# Patient Record
Sex: MALE | Race: WHITE
[De-identification: names, ages, dates, MRNs, and addresses within clinical notes are randomized per-mention and may not be internally consistent; named-entity substitution may affect disease eponyms.]

---

## 2018-03-27 ENCOUNTER — HOSPITAL ENCOUNTER (OUTPATIENT)
Dept: HOSPITAL 68 - ERH | Age: 78
Setting detail: OBSERVATION
LOS: 2 days | End: 2018-03-29
Attending: INTERNAL MEDICINE | Admitting: INTERNAL MEDICINE
Payer: COMMERCIAL

## 2018-03-27 VITALS — SYSTOLIC BLOOD PRESSURE: 140 MMHG | DIASTOLIC BLOOD PRESSURE: 96 MMHG

## 2018-03-27 VITALS — BODY MASS INDEX: 18.52 KG/M2 | HEIGHT: 69 IN | WEIGHT: 125.06 LBS

## 2018-03-27 DIAGNOSIS — E55.9: ICD-10-CM

## 2018-03-27 DIAGNOSIS — I10: ICD-10-CM

## 2018-03-27 DIAGNOSIS — E04.1: ICD-10-CM

## 2018-03-27 DIAGNOSIS — R00.0: ICD-10-CM

## 2018-03-27 DIAGNOSIS — E03.9: ICD-10-CM

## 2018-03-27 DIAGNOSIS — Z79.82: ICD-10-CM

## 2018-03-27 DIAGNOSIS — R55: Primary | ICD-10-CM

## 2018-03-27 LAB
ABSOLUTE GRANULOCYTE CT: 5.2 /CUMM (ref 1.4–6.5)
APTT BLD: 25 SEC (ref 25–37)
BASOPHILS # BLD: 0.1 /CUMM (ref 0–0.2)
BASOPHILS NFR BLD: 0.9 % (ref 0–2)
EOSINOPHIL # BLD: 0.1 /CUMM (ref 0–0.7)
EOSINOPHIL NFR BLD: 2.1 % (ref 0–5)
ERYTHROCYTE [DISTWIDTH] IN BLOOD BY AUTOMATED COUNT: 13.9 % (ref 11.5–14.5)
GRANULOCYTES NFR BLD: 73.9 % (ref 42.2–75.2)
HCT VFR BLD CALC: 43.1 % (ref 42–52)
LYMPHOCYTES # BLD: 1.2 /CUMM (ref 1.2–3.4)
MCH RBC QN AUTO: 32.1 PG (ref 27–31)
MCHC RBC AUTO-ENTMCNC: 33.2 G/DL (ref 33–37)
MCV RBC AUTO: 96.6 FL (ref 80–94)
MONOCYTES # BLD: 0.4 /CUMM (ref 0.1–0.6)
PLATELET # BLD: 197 /CUMM (ref 130–400)
PMV BLD AUTO: 9.1 FL (ref 7.4–10.4)
PROTHROMBIN TIME: 11.2 SEC (ref 9.4–12.5)
RED BLOOD CELL CT: 4.46 /CUMM (ref 4.7–6.1)
WBC # BLD AUTO: 7 /CUMM (ref 4.8–10.8)

## 2018-03-27 PROCEDURE — G0378 HOSPITAL OBSERVATION PER HR: HCPCS

## 2018-03-27 NOTE — RADIOLOGY REPORT
EXAMINATION:
XR PORTABLE CHEST
 
CLINICAL INFORMATION:
Syncope
 
COMPARISON:
8/22/2013
 
TECHNIQUE:
Portable frontal view of the chest was obtained.
 
FINDINGS:
No significant abnormality is noted involving the heart, lungs, mediastinum,
bony thorax or soft tissues.
 
IMPRESSION:
Unremarkable examination.

## 2018-03-27 NOTE — ED GENERAL ADULT
History of Present Illness
 
General
Chief Complaint: Syncope and Near-Syncope
Stated Complaint: BIBA SYNCOPAL EPISODE, A FIB
Source: patient, family
Exam Limitations: no limitations
 
Vital Signs & Intake/Output
Vital Signs & Intake/Output
 Vital Signs
 
 
Date Time Temp Pulse Resp B/P B/P Pulse O2 O2 Flow FiO2
 
     Mean Ox Delivery Rate 
 
 2307       Room Air  
 
 2246 97.9 63 22 140/96  98   
 
 2126 98.2 65 18 168/80  99 Room Air  
 
 1820 97.7 58 18 153/81  100 Room Air  
 
 1655 98.4 60 18 172/85  97 Room Air  
 
 1506      97 Room Air  
 
 1450 98.0 72 16 115/73  98 Room Air  
 
 
 
Allergies
Coded Allergies:
NO KNOWN ALLERGIES (13)
 
Reconcile Medications
Aspirin (Ecotrin*) 325 MG TABLET.DR   1 TAB PO Q48 HEART HEALTH  (Reported)
Cholecalciferol (Vitamin D3) (Vitamin D3) 400 UNIT TABLET   1 TAB PO DAILY 
VITAMIN SUPPORT  (Reported)
Levothyroxine Sodium (Synthroid) 75 MCG TABLET   1 TAB PO DAILY AC THYROID  (
Reported)
 
Triage Nurses Notes Reviewed? yes
Onset: Abrupt
Duration: day(s): (1)
Timing: remote history
Injury Environment: home
Severity: moderate
No Modifying Factors: none
HPI:
Patient is a 77-year-old male presenting to the emergency department with chief 
complaint of 2 episodes of syncope that happened just prior to arrival.  Patient
reports that he was feeling lightheaded after doing dishes and sat down.  Told 
his wife that he was given passout tried to get up to go to the bathroom and 
then passed out.  The wife lowered him to the ground.  He was out for several 
minutes and then woke up.  She called EMS and told him stamina ground but he had
a go to the bathroom so he crawled to the bathroom.  Patient then moved his 
bowels and then had another syncopal episode after moving his bowels on the 
toilet.  Per the wife he was out for several minutes, she thinks 10 minutes.  
And then EMS arrived.  When EMS arrived he was alert and oriented.  Patient does
report that the for onset of the first episode of syncope he had an episode of 
chest pressure that had resolved by the time he woke up.  Unsure of head strike.
 Denies any visual changes.  No current chest pain palpitations or shortness of 
breath.  History of similar symptoms about 5 years ago and had a complete workup
which was negative.  Does have extensive history of thyroid lesion, currently 
being monitored by his endocrinologist.  Denies any recent changes in 
medications.  No recent travel.  Denies recent illness.
(Maddie Shabazz)
 
Past History
 
Travel History
Traveled to [place] past 21 day No
 
Medical History
Any Pertinent Medical History? see below for history
 
Surgical History
Surgical History: non-contributory
 
Psychosocial History
Who do you live with Family
Services at Home None
What is your primary language English
 
Family History
Hx Contributory? No
(Maddie Shabazz)
 
Review of Systems
 
Review of Systems
Constitutional:
Reports: no symptoms. 
Comments
Review of systems: See HPI, All other systems negative.
Constitutional, no chills fever or weight loss
HEENT: No visual changes no sore throat no congestion
Cardiovascular: No palpitation , orthopnea or ankle swelling
Skin, no jaundice no rashes
Respiratory: No dyspnea cough sputum or hemoptysis
GI: No nausea no vomiting
: No dysuria No hematuria
Muscle skeletal: no back pain, no neck pain,
Neurologic: No numbness 
Psych: No stress anxiety or depression,.
Heme/endocrine: No bruising no bleeding no polyuria or polydipsia
Immunology: No splenectomy or history of AIDS
(Maddie Shabazz)
 
Physical Exam
 
Physical Exam
General Appearance: well developed/nourished, no apparent distress, alert, awake
, comfortable
Comments:
Well-developed well-nourished person in no acute distress
HEENT:  extraocular motion intact, no nystagmus. Pupils equally round and 
reactive to light and accommodation. Nose is atraumatic. External auditory canal
and Tympanic membranes clear. Pharynx normal. No swelling or edema. 
Neck: Supple, no lymphadenopathy, normal range of motion without pain or 
tenderness, no C-spine tenderness.
Back: Nontender
Cardiovascular: Regular rate and rhythms, unable to appreciate any murmurs rubs 
or gallops.
Respiratory: Chest nontender. No respiratory distress.breath sounds clear to 
auscultation bilaterally
Abdomen: Soft, nontender nondistended, no appreciable organomegaly. Normal bowel
sounds. No ascites, no rebound or guarding.  No palpable masses.
Extremity: No edema, no calf tenderness to palpation, normal and equal pulses.  
Full range of motion of upper and lower extremities without difficulty or pain. 
Muscular strength is 5 out of 5 in upper and lower extremities bilaterally.  
 strength is equal and symmetric bilaterally.
Neuro: Alert oriented x3, motor sensory normal, cranial nerves II through XII 
grossly intact.  Cerebellar testing is unremarkable.  Walks with steady gait.
Skin: No appreciable rash on exposed skin, skin is warm and dry.
Psych: Mood and affect is normal, memory and judgment is normal.
 
Core Measures
ACS in differential dx? Yes
CVA/TIA Diagnosis: No
Sepsis Present: No
Sepsis Focused Exam Completed? No
(Angie HASSAN,Maddie)
 
Progress
Differential Diagnoses
I considered the following diagnoses in my evaluation of the patient:  Pulmonary
embolus, intracranial hemorrhage, dissection, ACS, dehydration, orthostatic 
hypotension, seizure, cardiac arrhythmia
 
Plan of Care:
 Orders
 
 
Procedure Date/time Status
 
Regular Diet  B Active
 
CBC WITHOUT DIFFERENTIAL  0600 Active
 
BASIC ELECTROLYTES PLUS BUN&CR  0600 Active
 
TROPONIN LEVEL  0300 Active
 
EKG  0300 Active
 
Teach/Educate  230 Active
 
Pain Treatment and Response  230 Active
 
Nutritional Intake, Monitor  230 Active
 
Isolation  230 Active
 
Patient Care Conference  2304 Active
 
Activity/Ambulation  2304 Active
 
[id number] DOPPLER 2213 Active
 
ECHOCARDIOGRAM  2213 Active
 
Code Status  212 Active
 
TROPONIN LEVEL  2113 Complete
 
EKG 2113 Active
 
Pathway - chart  211 Active
 
House Staff  211 Active
 
Patient Data 2111 Active
 
Code Status  211 Complete
 
Patient Data 2012 Active
 
Place in observation  1943 Active
 
ED Holding Orders  1943 Active
 
Vital Signs  1943 Active
 
Code Status  1943 Complete
 
Add-on Test (ER Only)  1717 Active
 
MISTAKE  1524 Active
 
Intake & Output  1454 Active
 
D-DIMER  1449 Complete
 
Telemetry/Cardiac Monitor  1439 Active
 
URINALYSIS  1439 Complete
 
TSH REFLEX  1439 Complete
 
TROPONIN LEVEL  1439 Complete
 
PARTIAL THROMBOPLASTIN TIME  1439 Complete
 
PROTHROMBIN TIME  1439 Complete
 
COMPREHENSIVE METABOLIC PANEL  1439 Complete
 
CBC WITHOUT DIFFERENTIAL  1439 Complete
 
EKG  1439 Active
 
VTE Mechanical Prophylaxis   UNK Active
 
Vital Signs   UNK Active
 
MISTAKE   UNK Active
 
Telemetry/Cardiac Monitor   UNK Active
 
 
 Current Medications
 
 
  Sig/Pranav Start time  Last
 
Medication Dose  Stop Time Status Admin
 
Aspirin Buffered 325 MG Q48  1000 AC 
 
(Ecotrin)     
 
Cholecalciferol 400 IU DAILY  1000 AC 
 
(Vitamin D)     
 
Levothyroxine Sodium 0.075 MG DAILY AC  0700 AC 
 
(Synthroid)     
 
Heparin Sodium  5,000 UNIT Q8  2200 AC 
 
(Porcine)     
 
Acetaminophen 650 MG Q6P PRN  2115 AC 
 
(Tylenol)     
 
 
 Laboratory Tests
 
 
 
18 2135:
Troponin I 0.02
 
18 1533:
Urine Color YEL, Urine Clarity CLEAR, Urine pH 6.0, Ur Specific Gravity 1.020, 
Urine Protein TRACE  H, Urine Ketones NEG, Urine Nitrite NEG, Urine Bilirubin 
NEG, Urine Urobilinogen 0.2, Ur Leukocyte Esterase NEG, Ur Microscopic SEDIMENT 
EXAMINED, Urine RBC FEW  H, Urine WBC 1-3  H, Ur Epithelial Cells RARE, Urine 
Bacteria FEW  H, Hyaline Casts RARE  H, Urine Mucus RARE, Urine Hemoglobin NEG, 
Urine Glucose NEG
 
18 1449:
Anion Gap 12, Estimated GFR 53  L, BUN/Creatinine Ratio 19.2, Glucose 176  H, 
Calcium 9.3, Total Bilirubin 0.6, AST 39, ALT 37, Alkaline Phosphatase 50, 
Troponin I < 0.01, Total Protein 6.9, Albumin 3.9, Globulin 3.0, Albumin/
Globulin Ratio 1.3, TSH &T3 &Free T4 Intrp 1.530, PT 11.2, INR 1.03, APTT 25, D-
Dimer High Sensitivty 239, CBC w Diff NO MAN DIFF REQ, RBC 4.46  L, MCV 96.6  H,
MCH 32.1  H, MCHC 33.2, RDW 13.9, MPV 9.1, Gran % 73.9, Lymphocytes % 17.5  L, 
Monocytes % 5.6, Eosinophils % 2.1, Basophils % 0.9, Absolute Granulocytes 5.2, 
Absolute Lymphocytes 1.2, Absolute Monocytes 0.4, Absolute Eosinophils 0.1, 
Absolute Basophils 0.1
 
Diagnostic Imaging:
Viewed by Me: CT Scan.  Discussed w/RAD: CT Scan. 
Radiology Impression: RESENT AGE: 77  PATIENT ACCOUNT NO: [id number] : 39 
LOCATION: [facility] ORDERING PHYSICIAN: Maddie HASSAN     SERVICE DATE: 18 EXAM TYPE: CAT - CT CERV SPINE WO IV CONTRAST; CT HEAD WO IV CONTRAST 
EXAMINATION: CT HEAD WITHOUT CONTRAST CT CERVICAL SPINE WITHOUT CONTRAST 
CLINICAL INFORMATION: Question head strike. Rule out fracture. COMPARISON: None 
TECHNIQUE: CT of the head and cervical spine were performed without intravenous 
contrast. Multiplanar reformats were rendered and reviewed. DLP: 974 mGy-cm. 
FINDINGS: CT head: There is no intracranial hemorrhage, extra-axial collection, 
or calvarial fracture. There is no mass, mass effect, or CT evidence of large 
territory infarction. The ventricles are normal in size and configuration 
without evidence of hydrocephalus. The paranasal sinuses are clear. The mastoids
and middle ear cavities are clear. CT cervical spine: There is grade 1 
retrolisthesis of C3 on C4 and grade 1 anterolisthesis of C4 on C5. The 
alignment is otherwise maintained. No cervical spine fracture is seen. The 
craniovertebral junction is intact. There are multilevel degenerative changes 
with severe disc height loss at C3-C4, C5-C6, and C6-C7. There is multilevel 
uncovertebral and facet arthropathy. There is prominent disc osteophyte complex 
at C5-C6 which results in mild osseous encroachment on the spinal canal. There 
is severe neural foraminal stenosis bilaterally at C5-C6 and at C6-C7. There are
atheromatous calcifications at the bilateral carotid bifurcations. There is a 
mass within the left lobe of the thyroid gland which extends substernally into 
the superior mediastinum with full extent not imaged. This lesion measures on 
the order of 3.3 cm and demonstrates heterogeneous attenuation including areas 
of curvilinear and dystrophic calcifications. There is minimal mass effect on 
the trachea but no narrowing of its lumen. The lung apices are clear. IMPRESSION
: CT head: No acute intracranial abnormality. CT cervical spine: No cervical 
spine fracture or traumatic malalignment. Multilevel degenerative changes are 
noted with severe neural foraminal stenosis bilaterally at C5-C6 and C6-C7. 
Heterogeneous lesion in the left lobe of the thyroid extending substernally into
the superior mediastinum measuring on the order of 3.3 cm. According to the most
recent guidelines, thyroid ultrasound is warranted if this has not already been 
performed. DICTATED BY: Juan Og MD  DATE/TIME DICTATED:18 
:RAD.ZAMAN  DATE/TIME TRANSCRIBED:18 CONFIDENTIAL, 
DO NOT COPY WITHOUT APPROPRIATE AUTHORIZATION.  <Electronically signed in Other 
Vendor System>                                                                  
                     SIGNED BY: Juan Og MD 18
Initial ED EKG: NSR
Prior EKG: unchanged
Rhythm Strip: cardiac arrhythmia, read as atrial fibrillation by EMS
Comments:
Patient asymptomatic in the emergency department.  CT head, neck, blood work 
within normal range.  Patient has been in sinus rhythm.  Spoke with cardiology. 
They will consult in the morning.  Patient will be admitted for observation for 
syncope.  Patient may need cardiology and neurology consultations.  Patient made
halter monitor.
(Maddie Shabazz)
 
Departure
 
Departure
Time of Disposition: 
Disposition: STILL A PATIENT
Condition: Stable
Clinical Impression
Primary Impression: Syncope
Qualifiers:  Syncope type: unspecified Qualified Code: R55 - Syncope and 
collapse
Referrals:
Jose JON,Daniel KRISHNA (PCP/Family)
 
Departure Forms:
Customer Survey
General Discharge Information
 
Observation Note
Spoke With:
Bob Montague MD
Physician Advisor Notified: RANDALL FUENTES DO
Place Patient In: Non-ED OBS Care Area
Rationale for Observation:
My rational for observation is as follows .  Patient requiring echocardiogram, 
telemetry monitoring, serial EKGs and troponins, may require carotid Dopplers, 
neuro consultation.  Discharge at this time is medically harmful.
 
(Maddie Shabazz)
 
Observation Note
Rationale for Observation:
My rational for observation is as follows .
 
 
PA/NP Co-Sign Statement
Statement:
ED Attending supervision documentation-
 
[X] I saw and evaluated the patient. I have also reviewed all the pertinent lab 
results and diagnostic results. I agree with the findings and the plan of care 
as documented in the PA's/NP's documentation. 
 
[] I have reviewed the ED Record and agree with the PA's/NP's documentation.
 
[] Additions or exceptions (if any) to the PAs/NP's note and plan are 
summarized below:
[]
 
 
77-year-old man with episode of syncope.  EKG shows frequent PVCs.  Irregular 
bradycardic rhythm on rhythm strip with PJCs.  He is being assigned telemetry 
for dysrhythmia and syncope.
(Randall Fuentes DO)
 
Critical Care Note
 
Critical Care Note
Critical Care Time: 30-74 min
(Angie HASSAN,Maddie)

## 2018-03-27 NOTE — HISTORY & PHYSICAL
Nguyễn Riggs MD 03/27/18 3824:
General Information and HPI
MD Statement:
I have seen and personally examined LEE SIERRA and documented this H&P.
 
The patient is a 77 year old M who presented with a patient stated chief 
complaint of syncope.
 
Source of Information: patient, old records
Exam Limitations: no limitations
History of Present Illness:
77 year old male with past medical history significant for HTN (untreated), 
hypothyroidism, vitamin d deficiency, and thyroid nodule followed by Dr. Garcia 
presents for evaluation of syncopal episode.  Patient states he had the exact 
same presentation five year prior and had a negative work up at this time.  His 
symptoms started earlier today when he felt lightheaded as he was doing dishes. 
He decided he should sit down and was alarmed enough to tell his wife not to 
leave the house.  He got up from a seated position and fell on his hands and 
knees on ambulating to the bathroom.  He denied any loss of consciousness at 
that time although he states his wife thought he lost consciousness for a second
or two.  She called for an ambulance and told him not to get up.  He still had 
to use the bathroom so crawled to the toilet.  He then syncopized after having a
BM.  He lost consciousness and the next thing he remembers is waking up with 
EMTs around him.  He denies any chest pain, diaphoresis, palpitations, or nausea
prior to the syncopal episode.  His initial EKG showed bigeminy and on arrival 
in the ED showed PVCs.  The patient was asymptomatic at the time of evaluation. 
His review of systems is only positive for chronic loose BMs but otherwise no 
complaints.  He ambulates several miles a day without symptoms.  He reports no 
presyncopal episodes or orthostatic symptoms at any time between today and his 
episode five years prior.  He had imaging of the head, neck and chest x-ray 
performed and is admitted to telemetry for further evaluation.
 
Allergies/Medications
Allergies:
Coded Allergies:
NO KNOWN ALLERGIES (08/12/13)
 
Home Med list
Aspirin (Ecotrin*) 325 MG TABLET.   1 TAB PO Q48 HEART HEALTH  (Reported)
Cholecalciferol (Vitamin D3) (Vitamin D3) 400 UNIT TABLET   1 TAB PO DAILY 
VITAMIN SUPPORT  (Reported)
Levothyroxine Sodium (Synthroid) 75 MCG TABLET   1 TAB PO DAILY AC THYROID  (
Reported)
 
Compliance With Home Meds: GOOD
 
Past History
 
Travel History
Traveled to [place] past 21 day No
 
Medical History
Neurological: NONE
EENT: NONE
Cardiovascular: NONE
Respiratory: NONE
Gastrointestinal: NONE
Hepatic: NONE
Renal: NONE
Musculoskeletal: NONE
Psychiatric: NONE
Endocrine: hypothyroidism
Isolation History: Standard
 
Surgical History
Surgical History: non-contributory
 
Past Family/Social History
 
Psychosocial History
Services at Home: None
 
Functional Ability
ADLs
Independent: dressing, eating, toileting, bathing. 
Ambulation: independent
 
Review of Systems
 
Review of Systems
Constitutional:
Reports: no symptoms. 
EENTM:
Reports: no symptoms. 
Cardiovascular:
Reports: syncope. 
Respiratory:
Reports: no symptoms. 
GI:
Reports: no symptoms. 
Genitourinary:
Reports: no symptoms. 
Musculoskeletal:
Reports: no symptoms. 
Skin:
Reports: no symptoms. 
Neurological/Psychological:
Reports: no symptoms. 
Hematologic/Endocrine:
Reports: no symptoms. 
Immunologic/Allergic:
Reports: no symptoms. 
All Other Systems: Reviewed and Negative
 
Exam & Diagnostic Data
Last 24 Hrs of Vital Signs/I&O
 Vital Signs
 
 
Date Time Temp Pulse Resp B/P B/P Pulse O2 O2 Flow FiO2
 
     Mean Ox Delivery Rate 
 
03/27 2307       Room Air  
 
03/27 2246 97.9 63 22 140/96  98   
 
03/27 2126 98.2 65 18 168/80  99 Room Air  
 
03/27 1820 97.7 58 18 153/81  100 Room Air  
 
03/27 1655 98.4 60 18 172/85  97 Room Air  
 
03/27 1506      97 Room Air  
 
03/27 1450 98.0 72 16 115/73  98 Room Air  
 
 
 Intake & Output
 
 
 03/28 0800 03/28 0000 03/27 1600
 
Intake Total   0
 
Output Total  200 
 
Balance  -200 0
 
    
 
Intake, Oral   0
 
Output, Urine  200 
 
Patient  56.727 kg 
 
Weight   
 
 
 
 
Physical Exam
General Appearance Alert, Oriented X3, Cooperative, No Acute Distress
Cardiovascular Regular Rate, Normal S1, Normal S2, No Murmurs
Lungs Clear to Auscultation, Normal Air Movement
Abdomen Normal Bowel Sounds, Soft, No Tenderness, No Masses
Extremities No Clubbing, No Cyanosis, No Edema, Normal Pulses
Last 24 Hrs of Labs/Phil:
 Laboratory Tests
 
03/27/18 2135:
Troponin I 0.02
 
03/27/18 1533:
Urine Color YEL, Urine Clarity CLEAR, Urine pH 6.0, Ur Specific Gravity 1.020, 
Urine Protein TRACE  H, Urine Ketones NEG, Urine Nitrite NEG, Urine Bilirubin 
NEG, Urine Urobilinogen 0.2, Ur Leukocyte Esterase NEG, Ur Microscopic SEDIMENT 
EXAMINED, Urine RBC FEW  H, Urine WBC 1-3  H, Ur Epithelial Cells RARE, Urine 
Bacteria FEW  H, Hyaline Casts RARE  H, Urine Mucus RARE, Urine Hemoglobin NEG, 
Urine Glucose NEG
 
03/27/18 1449:
Anion Gap 12, Estimated GFR 53  L, BUN/Creatinine Ratio 19.2, Glucose 176  H, 
Calcium 9.3, Magnesium Pending, Total Bilirubin 0.6, AST 39, ALT 37, Alkaline 
Phosphatase 50, Troponin I < 0.01, Total Protein 6.9, Albumin 3.9, Globulin 3.0,
Albumin/Globulin Ratio 1.3, TSH &T3 &Free T4 Intrp 1.530, PT 11.2, INR 1.03, 
APTT 25, D-Dimer High Sensitivty 239, CBC w Diff NO MAN DIFF REQ, RBC 4.46  L, 
MCV 96.6  H, MCH 32.1  H, MCHC 33.2, RDW 13.9, MPV 9.1, Gran % 73.9, Lymphocytes
% 17.5  L, Monocytes % 5.6, Eosinophils % 2.1, Basophils % 0.9, Absolute 
Granulocytes 5.2, Absolute Lymphocytes 1.2, Absolute Monocytes 0.4, Absolute 
Eosinophils 0.1, Absolute Basophils 0.1
 
 
Diagnostic Data
CXR Results
No significant abnormality is noted involving the heart, lungs, mediastinum,
bony thorax or soft tissues.
Other Results
CT head:
There is no intracranial hemorrhage, extra-axial collection, or calvarial
fracture. There is no mass, mass effect, or CT evidence of large territory
infarction. The ventricles are normal in size and configuration without
evidence of hydrocephalus.
 
The paranasal sinuses are clear. The mastoids and middle ear cavities are
clear.
 
CT cervical spine:
There is grade 1 retrolisthesis of C3 on C4 and grade 1 anterolisthesis of C4
on C5. The alignment is otherwise maintained. No cervical spine fracture is
seen. The craniovertebral junction is intact. There are multilevel
degenerative changes with severe disc height loss at C3-C4, C5-C6, and C6-C7.
There is multilevel uncovertebral and facet arthropathy. There is prominent
disc osteophyte complex at C5-C6 which results in mild osseous encroachment
on the spinal canal. There is severe neural foraminal stenosis bilaterally at
C5-C6 and at C6-C7.
 
There are atheromatous calcifications at the bilateral carotid bifurcations.
There is a mass within the left lobe of the thyroid gland which extends
substernally into the superior mediastinum with full extent not imaged. This
lesion measures on the order of 3.3 cm and demonstrates heterogeneous
attenuation including areas of curvilinear and dystrophic calcifications.
There is minimal mass effect on the trachea but no narrowing of its lumen.
The lung apices are clear.
 
Assessment/Plan
Assessment:
77 year old male with past medical history significant for HTN (untreated), 
hypothyroidism, vitamin d deficiency, and thyroid nodule followed by Dr. Garcia 
presents for evaluation of syncopal episode.
 
Syncope:
 Monitor on telemetry
 Check orthostatic vital signs
 Check serial troponins and EKGs
 Check echocardiogram
 Cardiology consultation
 Check carotid ultrasound given atheromatous calcifications on CT
 CT head negative
 Continue aspirin
 
Hypothyroidism:
 TSH wnl
 Continue synthroid 75mcg
 
Hypertension:
 Patient was reluctant to start antihypertensive as an outpatient
 SBP 140s-170s
 Continue to monitor, discuss medical mgmt with patient
 
Hyperglycemia:
 Check hemoglobin a1c
 
Elevated creatinine:
 NS x 1L
 Repeat BEP in AM
 
Heart healthy diet
DVT ppx-heparin 5000unit subcutaneous q8h
DNR/DNI
 
As Ranked By This Provider
Problem List:
 1. Syncope
   Qualifiers
 Syncope type: unspecified Qualified Code: R55 - Syncope and collapse
 
 2. Hypothyroidism
 
 
Core Measures/Misc (9/17)
 
Acute Coronary Syndrome
ACS Diagnosis: No
 
Congestive Heart Failure
Congestive Heart Failure Diagnosis No
 
Cerebrovascular Accident
CVA/TIA Diagnosis: No
 
VTE (View Protocol)
VTE Risk Factors Age>40
No Mechanical VTE Prophylaxis d/t N/A MechProphylax Ordered
No VTE Pharm Prophylaxis d/t NA PharmProphylax ordered
 
Sepsis (View protocol)
Sepsis Present: No
 
 
Bob Montague 03/28/18 0351:
Attending MD Review Statement
 
Attending Statement
Attending MD Statement: examined this patient, discuss w/resident/PA/NP, agreed 
w/resident/PA/NP, reviewed EMR data (avail), reviewed images, amended to note
Attending Assessment/Plan:
 
CC: Syncope
PMH: Thyroid nodule, hypothyroidism, white coat hypertension
 
Patient was brought in ER through EMS after a syncopal episode at home. This 
morning patient was feeling lightheaded. Then he sat down on a recliner later he
had feeling of bowel movement so he was getting up to go to bathroom when he 
almost passed out, wife supported him and that time, he fell down and remembers 
crawling to the bathroom. At that time wife called EMS. While in bathroom 
patient actually passed out and remembers waking up when EMT was there. He did 
not have a diarrhea episode it was a regular bowel movement according to him. He
did not notice any chest pain, shortness of breath, diaphoresis, chest tightness
, nausea, vomiting before the episode. Wife did not notice any seizure-like 
activity. Patient had similar episode in 2013 when he was suggested to follow-up
outpatient which he could not. No obvious reason was found at that time, 
probably fluctuation and the blood pressure that was notified to him. He was 
suggested to wear stockings. Currently complete ROS unremarkable.
 
Vitals: Afebrile, pulse 60, RR 16, blood pressure 115/73 on arrival, saturating 
well on room air.
On exam: A O 3, cooperative, no acute distress, neck supple, JVD normal, no 
lymphadenopathy, mucosa moist, no focal neurological deficit, no dependent edema
, no obvious skin rashes or inflammation CVS: S1-S2, RRR. RS: Clear to 
auscultate bilaterally. Abdomen: Soft, NT, ND, bowel sounds present.
 
CXR: Unremarkable examination.
CT head and cervical spine without IV contrast:
No acute intracranial abnormality.
No cervical spine fracture or traumatic malalignment. Multilevel degenerative 
changes are noted with severe neural foraminal stenosis bilaterally at C5-C6 and
C6-C7. 
Heterogeneous lesion in the left lobe of the thyroid extending substernally into
the superior mediastinum measuring on the order of 3.3 cm. 
There are atheromatous calcifications at the bilateral carotid bifurcations.
 
Assessment and plan 
 
77-year-old male with history of hypothyroidism and thyroid nodule and probably 
whitecoat hypertension presented in ER after an episode of syncope. Patient felt
lightheaded followed by one episode of presyncope and then actual syncope event 
he lost consciousness. He denies any chest pain, palpitations, shortness of 
breath or diaphoresis before the syncopal episode. Complete examination normal. 
ECG done by EMT shows ventricular bigeminy, repeat ECG was normal except PVCs. 
Patient needs further evaluation for his syncopal episode, appears to be 
secondary to arrhythmia. He had similar episode in 2013, exact cause of syncope 
at that time was not clear, he was notified that it could be probably secondary 
to fluctuation in his blood pressure, suggested to wear stockings. 
 
+ Syncope and collapse
+ History of hypothyroidism and thyroid nodule
 
- Place in observation on telemetry
- Continuous telemetry monitoring
- Serial troponin and EKG
- Orthostatic vitals
- 2-D echocardiogram in a.m.
- Carotid Doppler given the atheromatous plaque in bilateral carotid 
bifurcation.
- Cardiology consult
- DVT prophylaxis
- Adequate pain control
- Check magnesium, replete if low
- DVT prophylaxis
 
 
 
 
Winston Singh 03/28/18 0358:
Resident Review Statement
Resident Statement: examined this patient, discussed with intern, agreed with 
intern, discussed with family, reviewed EMR data (avail), discussed with nursing
, discussed with case mgmt, reviewed images, amended to note
Other Findings:
This is a 77-year-old male with past medical history significant for 
hypothyroidism, white coat hypertension, stage I diastolic dysfunction, history 
of syncope in the past presented to the hospital for evaluation of an episode of
syncope.
 
Patient reports that he has had episode of witnessed syncope at home around this
morning.  Patient felt dizzy and lightheaded after his breakfast, fell on the 
floor with out LOC, remembers crawling to the bathroom for bowel movement.  He 
remembers that EMS was called after that as he passed out during bowel movement.
 Denies any bladder or bowel incontinence, seizures, postictal confusion.  
Denies any chest pain, short of breath, diaphoresis.
 
And reports that he had similar episode of syncope in 2013, he was admitted to 
[facility], echocardiogram was done in 2013 which showed stage I diastolic
dysfunction.  No obvious reason was found at that time he was suggested to wear 
stockings.  Were followed up with any cardiologist.
 
Review of systems was completely negative except for syncopal episode.  Denied 
smoking, alcohol abuse, illicit drug abuse.  He follows up with his primary care
physician Dr. Garcia twice in a year.
 
--------------------------------------------------------------------------------
----------
 
Vitals: 
Afebrile, pulse 60, RR 16, blood pressure 115/73 on arrival, saturating well on 
room air.
 
 
On exam: A O 3, cooperative, no acute distress, neck supple, JVD normal, no 
lymphadenopathy, mucosa moist, no focal neurological deficit, no dependent edema
, no obvious skin rashes or inflammation CVS: S1-S2, RRR. RS: Clear to 
auscultate bilaterally. Abdomen: Soft, NT, ND, bowel sounds present.
 
 
Labs
CBC and CMP within normal limits except for creatinine 1.3, baseline was 1.
LFT normal
TSH normal
 
EKG- EMT EKG showed ventricular bigeminy however repeat EKG was normal, sinus 
rhythm, rate 54, PVC.  No ST-T wave changes
CXR: Unremarkable examination.
CT head and cervical spine without IV contrast:
No acute intracranial abnormality.
No cervical spine fracture or traumatic malalignment. Multilevel degenerative 
changes are noted with severe neural foraminal stenosis bilaterally at C5-C6 and
C6-C7. 
Heterogeneous lesion in the left lobe of the thyroid extending substernally into
the superior mediastinum measuring on the order of 3.3 cm. 
There are atheromatous calcifications at the bilateral carotid bifurcations.
 
--------------------------------------------------------------------------------
------------
 
Syncope and collapse
77-year-old male with past medical history of hypothyroidism, syncope presented 
with an episode of witnessed syncope.  He felt lightheaded followed by an 
episode of presyncope, actual syncope during bowel movement.  No chest pain 
palpitations, short of breath or diaphoresis associated with the syncopal 
episode.
 
* Syncope possibly from vasovagal versus arrhythmias given his ventricular 
bigeminy, PVCs.
* Place in observation in the telemetry floor
* Continuous telemetry monitoring
* Serial troponins were negative
* Initial EKG by EMS showed ventricular bigeminy
* Repeat EKG sinus rhythm, rate 54, PVC, no acute ST-T wave changes
* Serial troponin and EKG
* Cardiology consult in a.m.
* 2-D echo in the a.m.
* orthostatic vitals
* vitals every shift
 
 
 
carotid artery calcifications
Cervical spine CT showed  atheromatous calcifications at the bilateral carotid 
bifurcations.
Will get a carotid artery ultrasound
 
 
 
 
Thyroid gland nodule
There is a mass within the left lobe of the thyroid gland which extends
substernally into the superior mediastinum with full extent not imaged. This
lesion measures on the order of 3.3 cm and demonstrates heterogeneous
attenuation including areas of curvilinear and dystrophic calcifications.
There is minimal mass effect on the trachea but no narrowing of its lumen.
* he needs outpatient follow-up for further workup
 
 
 
 
Hypothyroidism-continue Synthyroid 75 g daily
Continue vitamin D supplement
Continue aspirin 325 every 48
 
 
 
Patient is DNR/DNI
DVT prophylaxis subcutaneous heparin
Regular diet
Pain pathway altered
Replete electrolytes

## 2018-03-27 NOTE — CT SCAN REPORT
EXAMINATION:
CT HEAD WITHOUT CONTRAST
CT CERVICAL SPINE WITHOUT CONTRAST
 
CLINICAL INFORMATION:
Question head strike. Rule out fracture.
 
COMPARISON:
None
 
TECHNIQUE:
CT of the head and cervical spine were performed without intravenous
contrast. Multiplanar reformats were rendered and reviewed.
 
DLP:
974 mGy-cm.
 
FINDINGS:
CT head:
There is no intracranial hemorrhage, extra-axial collection, or calvarial
fracture. There is no mass, mass effect, or CT evidence of large territory
infarction. The ventricles are normal in size and configuration without
evidence of hydrocephalus.
 
The paranasal sinuses are clear. The mastoids and middle ear cavities are
clear.
 
CT cervical spine:
There is grade 1 retrolisthesis of C3 on C4 and grade 1 anterolisthesis of C4
on C5. The alignment is otherwise maintained. No cervical spine fracture is
seen. The craniovertebral junction is intact. There are multilevel
degenerative changes with severe disc height loss at C3-C4, C5-C6, and C6-C7.
There is multilevel uncovertebral and facet arthropathy. There is prominent
disc osteophyte complex at C5-C6 which results in mild osseous encroachment
on the spinal canal. There is severe neural foraminal stenosis bilaterally at
C5-C6 and at C6-C7.
 
There are atheromatous calcifications at the bilateral carotid bifurcations.
There is a mass within the left lobe of the thyroid gland which extends
substernally into the superior mediastinum with full extent not imaged. This
lesion measures on the order of 3.3 cm and demonstrates heterogeneous
attenuation including areas of curvilinear and dystrophic calcifications.
There is minimal mass effect on the trachea but no narrowing of its lumen.
The lung apices are clear.
 
IMPRESSION:
CT head:
No acute intracranial abnormality.
 
CT cervical spine:
No cervical spine fracture or traumatic malalignment. Multilevel degenerative
changes are noted with severe neural foraminal stenosis bilaterally at C5-C6
and C6-C7.
 
Heterogeneous lesion in the left lobe of the thyroid extending substernally
into the superior mediastinum measuring on the order of 3.3 cm. According to
the most recent guidelines, thyroid ultrasound is warranted if this has not
already been performed.

## 2018-03-28 VITALS — SYSTOLIC BLOOD PRESSURE: 138 MMHG | DIASTOLIC BLOOD PRESSURE: 74 MMHG

## 2018-03-28 VITALS — SYSTOLIC BLOOD PRESSURE: 144 MMHG | DIASTOLIC BLOOD PRESSURE: 86 MMHG

## 2018-03-28 VITALS — DIASTOLIC BLOOD PRESSURE: 72 MMHG | SYSTOLIC BLOOD PRESSURE: 170 MMHG

## 2018-03-28 LAB
ABSOLUTE GRANULOCYTE CT: 5.5 /CUMM (ref 1.4–6.5)
BASOPHILS # BLD: 0 /CUMM (ref 0–0.2)
BASOPHILS NFR BLD: 0.3 % (ref 0–2)
EOSINOPHIL # BLD: 0.1 /CUMM (ref 0–0.7)
EOSINOPHIL NFR BLD: 1.3 % (ref 0–5)
ERYTHROCYTE [DISTWIDTH] IN BLOOD BY AUTOMATED COUNT: 13.6 % (ref 11.5–14.5)
GRANULOCYTES NFR BLD: 78.3 % (ref 42.2–75.2)
HCT VFR BLD CALC: 43.5 % (ref 42–52)
LYMPHOCYTES # BLD: 1 /CUMM (ref 1.2–3.4)
MCH RBC QN AUTO: 31.6 PG (ref 27–31)
MCHC RBC AUTO-ENTMCNC: 32.7 G/DL (ref 33–37)
MCV RBC AUTO: 96.5 FL (ref 80–94)
MONOCYTES # BLD: 0.4 /CUMM (ref 0.1–0.6)
PLATELET # BLD: (no result) /CUMM (ref 130–400)
PMV BLD AUTO: 9.4 FL (ref 7.4–10.4)
RED BLOOD CELL CT: 4.51 /CUMM (ref 4.7–6.1)
WBC # BLD AUTO: 7 /CUMM (ref 4.8–10.8)

## 2018-03-28 NOTE — ECHOCARDIOGRAM REPORT
LEE SIERRA 
 
 Age:    77     :    1940      Gender:     M 
 
 MRN:    [id number] 
 
 Exam Date:     2018  
                09:15 
 
 Exam Location: 1  
 [place] 
 
 Ht (in):     68      Wt (lb):      125     BSA:    1.64 
 
 BP:          140     /     96 
 
 Ordering Physician:        Khanh Singh MD 
 
 Referring Physician:       Khanh Singh MD 
 
 Technologist:              Dom Curiel [facility] 
 
 Room Number:               189-2 
 
 Indications:       Arrhythmia 
 
 Rhythm:                 Sinus 
 
 Technical Quality:      Good 
 
 FINDINGS 
 
 Left Ventricle 
 Normal global left ventricular size, wall thickness, systolic  
 function with no obvious regional wall motion abnormalities. Left  
 ventricular ejection fraction is estimated at    >65  %. Normal left  
 ventricular diastolic filling pattern for age. 
 
 Right Ventricle 
 The right ventricle is normal in size and function. 
 
 Right Atrium 
 The right atrium is normal in size. 
 
 Left Atrium 
 The left atrium is normal in size.  The interatrial septum is  
 intact. 
 
 Mitral Valve 
 Mild thickening/calcification of the mitral valve leaflets. Trace  
 mitral regurgitation. 
 
 Aortic Valve 
 Focal thickening of the aortic valve cusps. No aortic stenosis.  
 Trace to mild aortic regurgitation. 
 
 Tricuspid Valve 
 The tricuspid valve is normal in structure and function.  There is   
 trace tricuspid regurgitation.  Pulmonary artery systolic pressure  
 is normal. 
 
 Pulmonic Valve 
 Structurally normal pulmonic valve.  There is trace pulmonic  
 regurgitation. 
 
 Pericardium 
 Normal pericardium without effusion.  No pleural effusion. 
 
 Great Vessels 
 Normal aortic root dimension.  The aortic arch and great vessels are  
 not well seen. 
 
 CONCLUSIONS 
 Normal global left ventricular size, wall thickness, systolic  
 function with no obvious regional wall motion abnormalities. 
 Left ventricular ejection fraction is estimated at    >65  %. 
 Normal left ventricular diastolic filling pattern for age. 
 The left atrium is normal in size. 
 Mild thickening/calcification of the mitral valve leaflets. 
 Trace mitral regurgitation. 
 Focal thickening of the aortic valve cusps. 
 No aortic stenosis. 
 Trace to mild aortic regurgitation. 
 Pulmonary artery systolic pressure is normal. 
 The aortic arch and great vessels are not well seen. 
 
 Barry Gaviria M.D. 
 (Electronically Signed) 
 Final Date:      2018  
                  12:38 
 
 MEASUREMENTS  (Male / Female) Normal Values 
 
 2D ECHO 
 LV Diastolic Diameter PLAX        4.5 cm                4.2 - 5.9 / 3.9 - 5.3 
cm 
 LV Systolic Diameter PLAX         2.8 cm                2.1 - 4.0 cm 
 LV Fractional Shortening PLAX     37.3 %                25 - 46  % 
 LV Ejection Fraction 2D Teich     67.4 %                 
 IVS Diastolic Thickness           0.9 cm                 
 LVPW Diastolic Thickness          0.9 cm                 
 LV Relative Wall Thickness        0.4                    
 LVOT Diameter                     2.0 cm                 
 Aortic Root Diameter              3.2 cm                 
 LA Systolic Diameter LX           2.7 cm                3.0 - 4.0 / 2.7 - 3.8 
cm 
 Ascending Aorta Diameter          3.3 cm                 
 
 DOPPLER 
 AV Peak Velocity                  108.0 cm/s             
 AV Peak Gradient                  4.7 mmHg               
 AV Mean Velocity                  78.2 cm/s              
 AV Mean Gradient                  3.0 mmHg               
 AV Velocity Time Integral         26.4 cm                
 LVOT Peak Velocity                102.0 cm/s             
 LVOT Peak Gradient                4.2 mmHg               
 LVOT Mean Velocity                52.7 cm/s              
 LVOT Mean Gradient                1.0 mmHg               
 LVOT Velocity Time Integral       20.9 cm                
 LVOT Stroke Volume                65.7 cm               
 AV Area Cont Eq vti               2.5 cm                
 AV Area Cont Eq pk                3.0 cm                
 MV Peak Velocity                  78.6 cm/s              
 MV Peak Gradient                  2.5 mmHg               
 MV Mean Velocity                  45.6 cm/s              
 MV Mean Gradient                  1.0 mmHg               
 Mitral E Point Velocity           87.4 cm/s              
 Mitral A Point Velocity           64.7 cm/s              
 Mitral E to A Ratio               1.4                    
 MV PHT Velocity                   78.7 cm/s              
 MV Deceleration [place]             231.0 cm/s            
 MV Pressure Half Time             102.2 ms               
 MV Area PHT                       2.2 cm                
 MV Deceleration Time              254.0 ms               
 TR Peak Velocity                  265.0 cm/s             
 TR Peak Gradient                  28.1 mmHg              
 Right Atrial Pressure             5.0 mmHg               
 Pulmonary Artery Systolic Pressu  33.1 mmHg              
 Right Ventricular Systolic Press  33.1 mmHg              
 PV Peak Velocity                  95.1 cm/s              
 PV Peak Gradient                  3.6 mmHg               
 PV Mean Velocity                  65.2 cm/s              
 PV Mean Gradient                  2.0 mmHg               
 PV Velocity Time Integral         24.5 cm                
 LV E' Lateral Velocity            10.7 cm/s              
 Mitral E to LV E' Lateral Ratio   8.2                    
 LV E' Septal Velocity             7.7 cm/s               
 Mitral E to LV E' Septal Ratio    11.4

## 2018-03-28 NOTE — PN-OBSERVATION
Ul Frank JON,[facility] 03/28/18 0755:
Observation Note
 
Observation Note
_
I have personally examined LEE SIERRA. him disposition is uncertain at this
time. Before a determination can be made, he requires continued observation for 
the following reasons [recurrent syncope pending workup].
 
 
Assessment/Plan Medical
Assessment:
77-year-old male with past medical history significant for hypothyroidism, 
whitecoat hypertension history of stage I diastolic dysfunction in 2013 and 
history of syncope, history of thyroid nodule brought to emergency department by
EMS after patient passed out on his toilet.
 
Vitals in emergency department, patient afebrile, no tachypnea, no tachycardia, 
no hypotension or uncontrolled hypertension, oxygen saturation of 97-99% on room
air.  EKG emergency department showed normal sinus rhythm as per EMS rhythm 
strip read as atrial fibrillation.  Head CT was negative for any intracranial 
findings.
 
Patient remained afebrile overnight.  Hemodynamically stable.  On telemetry 
monitor episodes of sinus bradycardia noticed.  Along with first-degree heart 
block, lowest heart rate 49 and highest heart rate 66.
 
Patient was placed in observation for the management of following problems
 
New episode of Syncope/history of syncope/history of diastolic dysfunction
Patient has been worked up in the past in 2013 for a similar episode of syncope.
 He was noticed to have stage I diastolic dysfunction, ejection fraction of 60% 
and mild mitral regurgitation and mild aortic regurg and aortic sclerosis.  
Current episode of syncope was situational as per the patient, initially he felt
lightheaded and then he crawled to the restroom floor and had a bowel movement 
and after that he does not remember when he lost consciousness. Patient denied 
any chest pain, shortness of breath, dyspnea on exertion, headaches, history of 
seizures, or sudden weakness.  Patient had a head CT that showed no acute 
intracranial abnormality in emergency department, CT cervical spine showed 
multilevel degenerative changes, atheromatous calcifications at the bilateral 
carotid bifurcations and 3.3 cm heterogenous lesion in the left lobe of thyroid.
 Patient is positive for orthostatic hypotension. This point out more towards 
syncope in the setting of dehydration.  Vasovagal syncopes and is a possibility 
as patient had a bowel movement before syncope.  Echocardiogram and ultrasound 
of carotids was ordered overnight. Cardiology consult was also placed. 
 
History of thyroid nodule/hypothyroidism
 
Patient has history of thyroid nodule for which he follows up with Dr. Garcia.  CT
scan of the head also noticed heterogenous lesion on the left lower thyroid 
extending substernally into the superior mediastinum measuring 3.3 cm.  Patient 
will need further workup as an outpatient.  Patient is also on levothyroxine for
the management of hypothyroidism
 
CODE STATUS
Patient is DNR/DNI
 
Diet
Patient is on regular diet
 
 
Problem List:
 1. Syncope
   Qualifiers
 Syncope type: unspecified Qualified Code: R55 - Syncope and collapse
 
DVT/Prophylaxis: pharmacological
 
Subjective
Follow-up For:
Recurrent Syncope
Complaints: no complaints
Subjective:
Patient was noted he lying in the bed.  He remained afebrile overnight to 
complaints of shortness of breath or chest pain overnight.  He did not have any 
other syncopal episodes. 
 
Review of Systems
Constitutional:
Denies: chills, fever. 
EENTM:
Denies: visual changes. 
Cardiovascular:
Denies: chest pain, palpitations. 
Respiratory:
Denies: short of breath. 
Gastrointestinal:
Denies: abdominal pain, nausea, vomiting. 
Genitourinary:
Denies: discharge. 
 
Objective
Last 24 Hrs of Vital Signs/I&O
 Vital Signs
 
 
Date Time Temp Pulse Resp B/P B/P Pulse O2 O2 Flow FiO2
 
     Mean Ox Delivery Rate 
 
03/28 0755 98.4 52 22 170/72  97 Room Air  
 
03/27 2307       Room Air  
 
03/27 2246 97.9 63 22 140/96  98   
 
03/27 2126 98.2 65 18 168/80  99 Room Air  
 
03/27 1820 97.7 58 18 153/81  100 Room Air  
 
03/27 1655 98.4 60 18 172/85  97 Room Air  
 
03/27 1506      97 Room Air  
 
03/27 1450 98.0 72 16 115/73  98 Room Air  
 
 
 Intake & Output
 
 
 03/28 1600 03/28 0800 03/28 0000
 
Intake Total  400 
 
Output Total   200
 
Balance  400 -200
 
    
 
Intake, Oral  400 
 
Output, Urine   200
 
Patient   125 lb
 
Weight   
 
 
 
 
Physical Exam
General Appearance: Alert, Oriented X3, Cooperative, No Acute Distress
HEENT: Atraumatic
Neck: Supple, No Bruit
Cardiovascular: Regular Rate, Normal S1, Normal S2, Systolic murmur
Lungs: Clear to Auscultation, Normal Air Movement
Abdomen: Normal Bowel Sounds, Soft, No Tenderness, No Hepatospenomegaly
Neurological: Normal Speech, Normal Tone, Sensation Intact
Extremities: No Clubbing, No Cyanosis, No Edema
Current Medications:
 Current Medications
 
 
  Sig/Pranav Start time  Last
 
Medication Dose Route Stop Time Status Admin
 
Acetaminophen 650 MG Q6P PRN 03/27 2115 AC 
 
  PO   
 
Aspirin Buffered 325 MG Q48 03/29 1000 AC 
 
  PO   
 
Cholecalciferol 400 IU DAILY 03/28 1000 AC 03/28
 
  PO   0847
 
Heparin Sodium  5,000 UNIT Q8 03/27 2200 AC 
 
(Porcine)  SC   
 
Levothyroxine Sodium 0.075 MG DAILY AC 03/28 0700 AC 
 
  PO   
 
 
 
Last 24 Hrs of Labs/Mics:
 Laboratory Tests
 
03/28/18 0415:
Troponin I 0.02
 
03/28/18 0415:
Anion Gap 11, Estimated GFR > 60, BUN/Creatinine Ratio 17.3, CBC w Diff MAN DIFF
ORDERED, RBC 4.51  L, MCV 96.5  H, MCH 31.6  H, MCHC 32.7  L, RDW 13.6, Plt 
Count ND, MPV 9.4, Gran % 78.3  H, Lymphocytes % 13.7  L, Monocytes % 6.4, 
Eosinophils % 1.3, Basophils % 0.3, Absolute Granulocytes 5.5, Segmented 
Neutrophils 83  H, Absolute Lymphocytes 1.0  L, Lymphocytes 9  L, Monocytes 5, 
Absolute Monocytes 0.4, Eosinophils 3, Absolute Eosinophils 0.1, Absolute 
Basophils 0, Platelet Estimate ADEQUATE, Polychromasia 1+, Hypochromic-
Microcytic 1+, Poikilocytosis 1+, Ovalocytes 1+, Fld Total RBCs Counted 100
 
03/27/18 2135:
Troponin I 0.02
 
03/27/18 1533:
Urine Color YEL, Urine Clarity CLEAR, Urine pH 6.0, Ur Specific Gravity 1.020, 
Urine Protein TRACE  H, Urine Ketones NEG, Urine Nitrite NEG, Urine Bilirubin 
NEG, Urine Urobilinogen 0.2, Ur Leukocyte Esterase NEG, Ur Microscopic SEDIMENT 
EXAMINED, Urine RBC FEW  H, Urine WBC 1-3  H, Ur Epithelial Cells RARE, Urine 
Bacteria FEW  H, Hyaline Casts RARE  H, Urine Mucus RARE, Urine Hemoglobin NEG, 
Urine Glucose NEG
 
03/27/18 1449:
Anion Gap 12, Estimated GFR 53  L, BUN/Creatinine Ratio 19.2, Glucose 176  H, 
Calcium 9.3, Magnesium 2.0, Total Bilirubin 0.6, AST 39, ALT 37, Alkaline 
Phosphatase 50, Troponin I < 0.01, Total Protein 6.9, Albumin 3.9, Globulin 3.0,
Albumin/Globulin Ratio 1.3, TSH &T3 &Free T4 Intrp 1.530, PT 11.2, INR 1.03, 
APTT 25, D-Dimer High Sensitivty 239, CBC w Diff NO MAN DIFF REQ, RBC 4.46  L, 
MCV 96.6  H, MCH 32.1  H, MCHC 33.2, RDW 13.9, MPV 9.1, Gran % 73.9, Lymphocytes
% 17.5  L, Monocytes % 5.6, Eosinophils % 2.1, Basophils % 0.9, Absolute 
Granulocytes 5.2, Absolute Lymphocytes 1.2, Absolute Monocytes 0.4, Absolute 
Eosinophils 0.1, Absolute Basophils 0.1
 
 
Michael JON,Frank 03/28/18 1531:
Observation Note
 
Observation Note
_
I have personally examined LEE SIERRA. him disposition is uncertain at this
time. Before a determination can be made, he requires continued observation for 
the following reasons [].
 
Patient seen and examined.  His wife was at the bedside.  Case discussed with 
Dr. Gaviria from cardiology.  Patient does not report any further dizziness or 
lightheadedness.  He is afebrile his vital signs stable and he is saturating 
well on room air 97% saturation.  His chest exam is clear neuro exam is 
nonfocal.  Abdomen soft nontender pulses are present.  His 3 sets of troponin 
have been negative and his creatinine has decreased  to 1.1.  Results of imaging
studies including carotid Doppler and echocardiogram and CT head scan were 
reviewed.
 
Assessment plan
Presyncopal and syncopal episode at home, need to rule out arrhythmia and 
bradycardia arrhythmia.  Strong possibility of vasovagal syncope due to 
dehydration since patient had elevated creatinine and was postural.
 
Plan is to continue telemetry monitoring.  Patient may need Holter upon 
discharge.  We will not pursue any further workup for thyroid nodule since he is
being followed by Dr. Garcia as outpatient. 
Please repeat orthostatic blood pressure in a.m.  Patient still orthostatic he 
will need the lack stockings.

## 2018-03-28 NOTE — CONS- CARDIOLOGY
General Information and HPI
 
Consulting Request
Date of Consult: 18
Requested By:
Frank Rodriguez MD
 
Reason for Consult:
Syncope
Source of Information: patient
Exam Limitations: no limitations
History of Present Illness:
The patient is a 77-year-old man who has been generally healthy except for 
hypothyroidism.  In  he was admitted briefly for dizziness.  There 
apparently was no [place] syncope at that time.  An echocardiogram showed only 
aortic sclerosis and normal LV function.  He did not follow up with cardiology 
after this admission.
 
He was then well until yesterday when he had 2 documented syncopal episodes.  
The first was when he got up from a chair and told his wife that he felt dizzy 
and passed out briefly, she says for about 10 seconds.  He then awoke and said 
he had to go to the bathroom.  He sat on the toilet, had a bowel movement and 
passed out again.  His wife says this was a longer episode, perhaps a few 
minutes to 10 minutes.  The wife states that he was still passed out when EMTs 
arrived, but the ER notes states that he was alert when EMTs arrived.  The 
rhythm strips and EKG from EMTs documents a tachycardia, possibly atrial 
fibrillation with PVCs.  There is also period of bradycardia noted, possibly a 
sick sinus syndrome with a junctional escape rhythm at a rate of about 50-55.  
Subsequent EKG showed sinus rhythm.  This morning while the patient was up to 
the bathroom he had some tachycardia noted with a bundle-branch pattern on the 
monitor at a rate in the 120s.  This is possibly a rate related bundle branch 
block but this is not certain.  He has had no significant bradycardia since he 
has been here. 
 
The patient denies any known heart disease.  He denies chest pain or shortness 
of breath on exertion.  He is a lifelong nonsmoker.  He does have labile blood 
pressure but is not on medications for this.  His only medications at home are 
Synthroid and aspirin.
 
Allergies/Medications
Allergies:
Coded Allergies:
NO KNOWN ALLERGIES (13)
 
Home Med List:
Aspirin (Ecotrin*) 325 MG TABLET.   1 TAB PO Q48 HEART HEALTH  (Reported)
Cholecalciferol (Vitamin D3) (Vitamin D3) 400 UNIT TABLET   1 TAB PO DAILY 
VITAMIN SUPPORT  (Reported)
Levothyroxine Sodium (Synthroid) 75 MCG TABLET   1 TAB PO DAILY AC THYROID  (
Reported)
 
Current Medications:
 Current Medications
 
 
  Sig/Pranav Start time  Last
 
Medication Dose Route Stop Time Status Admin
 
Acetaminophen 650 MG Q6P PRN  2115 AC 
 
  PO   
 
Aspirin Buffered 325 MG Q48  1000 AC 
 
  PO   
 
Cholecalciferol 400 IU DAILY  1000 AC 
 
  PO   0847
 
Heparin Sodium  5,000 UNIT Q8  2200 AC 
 
(Porcine)  SC   
 
Levothyroxine Sodium 0.075 MG DAILY AC  0700 AC 
 
  PO   
 
 
 
 
Review of Systems
Review of Systems:
  He has no other complaints in the review of systems.
 
Past History
 
Travel History
Traveled to [place] past 21 day No
 
Medical History
Blood Transfusion Hx: No
Neurological: NONE
EENT: NONE
Cardiovascular: hypertension
Respiratory: NONE
Gastrointestinal: NONE
Hepatic: NONE
Renal: NONE
Musculoskeletal: NONE
Psychiatric: NONE
Endocrine: hypothyroidism
Blood Disorders: NONE
Cancer(s): NONE
GYN/Reproductive: NONE
 
Surgical History
Surgical History: non-contributory
 
Psychosocial History
Services at Home: None
Smoking Status: Never Smoked
 
Functional Ability
ADLs
Independent: dressing, eating, toileting, bathing. 
Ambulation: independent
 
Exam & Diagnostic Data
Vital Signs and I&O
Vital Signs
 
 
Date Time Temp Pulse Resp B/P B/P Pulse O2 O2 Flow FiO2
 
     Mean Ox Delivery Rate 
 
 0755 98.4 52 22 170/72  97 Room Air  
 
 2307       Room Air  
 
 2246 97.9 63 22 140/96  98   
 
 2126 98.2 65 18 168/80  99 Room Air  
 
 1820 97.7 58 18 153/81  100 Room Air  
 
 1655 98.4 60 18 172/85  97 Room Air  
 
 1506      97 Room Air  
 
 1450 98.0 72 16 115/73  98 Room Air  
 
 
 Intake & Output
 
 
  1600  08 0000  1600  0800  0000
 
Intake Total  400  0  
 
Output Total   200   
 
Balance  400 -200 0  
 
       
 
Intake, Oral  400  0  
 
Output, Urine   200   
 
Patient   125 lb   
 
Weight      
 
 
 
Physical Exam:
Well-developed well-nourished elderly man in no acute distress, alert and 
cooperative
HEENT exam normal
Neck veins not distended
Carotids normal
Chest is clear
Heart regular rhythm, occasional extrasystole heard, grade 2/6 systolic ejection
murmur at the base
Extremities no edema good pulses
Labs/Phil Results:
 Laboratory Tests
 
 
 
 
 0415 0415 2135
 
Chemistry   
 
  Sodium (137 - 145 mmol/L)  144 
 
  Potassium (3.5 - 5.1 mmol/L)  4.0 
 
  Chloride (98 - 107 mmol/L)  110  H 
 
  Carbon Dioxide (22 - 30 mmol/L)  23 
 
  Anion Gap (5 - 16)  11 
 
  BUN (9 - 20 mg/dL)  19 
 
  Creatinine (0.7 - 1.2 mg/dL)  1.1 
 
  Estimated GFR (>60 ml/min)  > 60 
 
  BUN/Creatinine Ratio (7 - 25 %)  17.3 
 
  Troponin I (<0.11 ng/ml) 0.02  0.02
 
Hematology   
 
  CBC w Diff  MAN DIFF ORDERED 
 
  WBC (4.8 - 10.8 /CUMM)  7.0 
 
  RBC (4.70 - 6.10 /CUMM)  4.51  L 
 
  Hgb (14.0 - 18.0 G/DL)  14.2 
 
  Hct (42 - 52 %)  43.5 
 
  MCV (80.0 - 94.0 FL)  96.5  H 
 
  MCH (27.0 - 31.0 PG)  31.6  H 
 
  MCHC (33.0 - 37.0 G/DL)  32.7  L 
 
  RDW (11.5 - 14.5 %)  13.6 
 
  Plt Count (130 - 400 /CUMM)  ND 
 
  MPV (7.4 - 10.4 FL)  9.4 
 
  Gran % (42.2 - 75.2 %)  78.3  H 
 
  Lymphocytes % (20.5 - 51.1 %)  13.7  L 
 
  Monocytes % (1.7 - 9.3 %)  6.4 
 
  Eosinophils % (0 - 5 %)  1.3 
 
  Basophils % (0.0 - 2.0 %)  0.3 
 
  Absolute Granulocytes (1.4 - 6.5 /CUMM)  5.5 
 
  Segmented Neutrophils (42.2 - 75.2 %)  83  H 
 
  Absolute Lymphocytes (1.2 - 3.4 /CUMM)  1.0  L 
 
  Lymphocytes (20.5 - 51.1 %)  9  L 
 
  Monocytes (1.7 - 9.3 %)  5 
 
  Absolute Monocytes (0.10 - 0.60 /CUMM)  0.4 
 
  Eosinophils (0 - 5.0 %)  3 
 
  Absolute Eosinophils (0.0 - 0.7 /CUMM)  0.1 
 
  Absolute Basophils (0.0 - 0.2 /CUMM)  0 
 
  Platelet Estimate (ADEQUATE)  ADEQUATE 
 
  Polychromasia  1+ 
 
  Hypochromic-Microcytic  1+ 
 
  Poikilocytosis  1+ 
 
  Ovalocytes  1+ 
 
Other Body Source   
 
  Fld Total RBCs Counted (%)  100 
 
 
 
 
 
 
 1533
 
Urines 
 
  Urine Color (YEL,AMB,STR) YEL
 
  Urine Clarity (CLEAR) CLEAR
 
  Urine pH (5.0 - 8.0) 6.0
 
  Ur Specific Gravity (1.001 - 1.035) 1.020
 
  Urine Protein (NEG,<30 MG/DL) TRACE  H
 
  Urine Ketones (NEG) NEG
 
  Urine Nitrite (NEG) NEG
 
  Urine Bilirubin (NEG) NEG
 
  Urine Urobilinogen (0.1  -  1.0 EU/dl) 0.2
 
  Ur Leukocyte Esterase (NEG) NEG
 
  Ur Microscopic SEDIMENT EXAMINED
 
  Urine RBC (0 - 5 /HPF) FEW  H
 
  Urine WBC (0 - 2 /HPF) 1-3  H
 
  Ur Epithelial Cells (NONE,FEW) RARE
 
  Urine Bacteria (NEG/NONE) FEW  H
 
  Hyaline Casts (0/LPF) RARE  H
 
  Urine Mucus (FEW,NONE) RARE
 
  Urine Hemoglobin (NEG) NEG
 
  Urine Glucose (N MG/DL) NEG
 
 
 
 
 
 
 1449
 
Chemistry 
 
  Sodium (137 - 145 mmol/L) 142
 
  Potassium (3.5 - 5.1 mmol/L) 3.8
 
  Chloride (98 - 107 mmol/L) 103
 
  Carbon Dioxide (22 - 30 mmol/L) 28
 
  Anion Gap (5 - 16) 12
 
  BUN (9 - 20 mg/dL) 25  H
 
  Creatinine (0.7 - 1.2 mg/dL) 1.3  H
 
  Estimated GFR (>60 ml/min) 53  L
 
  BUN/Creatinine Ratio (7 - 25 %) 19.2
 
  Glucose (65 - 99 mg/dL) 176  H
 
  Calcium (8.4 - 10.2 mg/dL) 9.3
 
  Magnesium (1.6 - 2.3 mg/dL) 2.0
 
  Total Bilirubin (0.2 - 1.3 mg/dL) 0.6
 
  AST (17 - 59 U/L) 39
 
  ALT (21 - 72 U/L) 37
 
  Alkaline Phosphatase (< 127 U/L) 50
 
  Troponin I (<0.11 ng/ml) < 0.01
 
  Total Protein (6.3 - 8.2 g/dL) 6.9
 
  Albumin (3.5 - 5.0 g/dL) 3.9
 
  Globulin (1.9 - 4.2 gm/dL) 3.0
 
  Albumin/Globulin Ratio (1.1 - 2.2 %) 1.3
 
  TSH &T3 &Free T4 Intrp (0.27 - 4.20 uIU/mL) 1.530
 
Coagulation 
 
  PT (9.4 - 12.5 SEC) 11.2
 
  INR (0.90 - 1.17) 1.03
 
  APTT (25 - 37 SEC) 25
 
  D-Dimer High Sensitivty (0 - 243 ng/ml) 239
 
Hematology 
 
  CBC w Diff NO MAN DIFF REQ
 
  WBC (4.8 - 10.8 /CUMM) 7.0
 
  RBC (4.70 - 6.10 /CUMM) 4.46  L
 
  Hgb (14.0 - 18.0 G/DL) 14.3
 
  Hct (42 - 52 %) 43.1
 
  MCV (80.0 - 94.0 FL) 96.6  H
 
  MCH (27.0 - 31.0 PG) 32.1  H
 
  MCHC (33.0 - 37.0 G/DL) 33.2
 
  RDW (11.5 - 14.5 %) 13.9
 
  Plt Count (130 - 400 /CUMM) 197
 
  MPV (7.4 - 10.4 FL) 9.1
 
  Gran % (42.2 - 75.2 %) 73.9
 
  Lymphocytes % (20.5 - 51.1 %) 17.5  L
 
  Monocytes % (1.7 - 9.3 %) 5.6
 
  Eosinophils % (0 - 5 %) 2.1
 
  Basophils % (0.0 - 2.0 %) 0.9
 
  Absolute Granulocytes (1.4 - 6.5 /CUMM) 5.2
 
  Absolute Lymphocytes (1.2 - 3.4 /CUMM) 1.2
 
  Absolute Monocytes (0.10 - 0.60 /CUMM) 0.4
 
  Absolute Eosinophils (0.0 - 0.7 /CUMM) 0.1
 
  Absolute Basophils (0.0 - 0.2 /CUMM) 0.1
 
 
 
 
Diagnostic Data
EKG Results
SR 60, mult PACs, 1st deg AV Block, IRBBB, LAFB
CXR Results
PATIENT: LEE SIERRA  MEDICAL RECORD NO: [id number]
PRESENT AGE: 77  PATIENT ACCOUNT NO: [id number]
: 39  LOCATION: [facility]
ORDERING PHYSICIAN: Maddie HASSAN  
 
  SERVICE DATE: 
EXAM TYPE: RAD - XRY-PORTABLE CHEST XRAY
 
EXAMINATION:
XR PORTABLE CHEST
 
CLINICAL INFORMATION:
Syncope
 
COMPARISON:
2013
 
TECHNIQUE:
Portable frontal view of the chest was obtained.
 
FINDINGS:
No significant abnormality is noted involving the heart, lungs, mediastinum,
bony thorax or soft tissues.
 
IMPRESSION:
Unremarkable examination.
 
 
DICTATED BY: Jorge Alberto Elizabeth MD 
DATE/TIME DICTATED:18
:[email or web address] 
DATE/TIME TRANSCRIBED:18
 
CONFIDENTIAL, DO NOT COPY WITHOUT APPROPRIATE AUTHORIZATION.
 
 <Electronically signed in Other Vendor System>                                 
          
                                           SIGNED BY: Jorge Alberto Elizabeth MD 18 0279
 
 
 
Other Results
CONCLUSIONS 
 Normal global left ventricular size, wall thickness, systolic  
 function with no obvious regional wall motion abnormalities. 
 Left ventricular ejection fraction is estimated at    >65  %. 
 Normal left ventricular diastolic filling pattern for age. 
 The left atrium is normal in size. 
 Mild thickening/calcification of the mitral valve leaflets. 
 Trace mitral regurgitation. 
 Focal thickening of the aortic valve cusps. 
 No aortic stenosis. 
 Trace to mild aortic regurgitation. 
 Pulmonary artery systolic pressure is normal. 
 The aortic arch and great vessels are not well seen. 
 
 Barry Gaviria M.D. 
 (Electronically Signed) 
 Final Date:      2018  
                  12:38
 
Assessment/Plan
Assessment/Plan
This patient presents with 2 witnessed syncopal episodes.  He has had some 
arrhythmias documented including some tachycardia and some bradycardia 
documented by EMTs.  It is possible he has sinus node dysfunction with a 
tachybrady syndrome which might explain his syncopal episodes.  He also has 
multiple conduction defects on his electrocardiogram. I think we need some 
further monitoring to determine this.  He has had his echocardiogram which I 
will review. I would like to have him be active in the hospital walking and 
monitor his rhythm.  If we don't find anything we might consider further 
outpatient evaluation with Holter or longer term monitoring.  I told him that if
we found anything consistent with sinus node dysfunction or tachybrady syndrome 
he would probably need a pacemaker.
 
 
Consult Acknowledgment
- Thank you for your consult request.

## 2018-03-28 NOTE — PATIENT DISCHARGE INSTRUCTIONS
Discharge Instructions
 
General Discharge Information
You were seen/treated for:
Recurrent syncope
Possible arrhythmia (tachybradycardia syndrome)
History of thyroid nodule
Special Instructions:
Follow-up with cardiologist after a week of discharge to have holter monitoring
Follow-up with primary care doctor after discharge
Follow-up with Dr. Garcia for thyroid nodule
Please have adequate intake of salt
 
 
Diet
Continue normal diet: Yes
 
Activity
Full Activity/No Limits: Yes
Additional ACTIVITY Info:
As tolerated
 
Acute Coronary Syndrome
 
Inclusion Criteria
At DC or during hospital stay patient has or had the following:
ACS DIAGNOSIS No
 
Discharge Core Measures
Meds if any: Prescribed or Continued at Discharge
Meds if any: NOT Prescribed or Continued at Discharge
 
Congestive Heart Failure
 
Inclusion Criteria
At DC or during hospital stay patient has or had the following:
CHF DIAGNOSIS No
 
Discharge Core Measures
Meds if any: Prescribed or Continued at Discharge
Meds if any: NOT Prescribed or Continued at Discharge
 
Cerebrovascular accident
 
Inclusion Criteria
At DC or during hospital stay patient has or had the following:
CVA/TIA Diagnosis No
 
Discharge Core Measures
Meds if any: Prescribed or Continued at Discharge
Meds if any: NOT Prescribed or Continued at Discharge
 
Venous thromboembolism
 
Inclusion Criteria
VTE Diagnosis No
VTE Type NONE
VTE Confirmed by (Test) NONE
 
Discharge Core Measures
- Per Current guidelines, there needs to be overlap
- treatment for the first 5 days of Warfarin therapy.
- If discharged on Warfarin prior to 5 days of
- overlap therapy, the patient will need to be
- assessed for post discharge needs including
- *Post discharge parental anticoagulation
- *Warfarin and/or parental anticoagulation education
- *Follow up date to check INR post discharge
At least 5 days overlap therapy as Inpatient No
Meds if any: Prescribed or Continued at Discharge
Note: Overlap Therapy is Warfarin and Anticoagulant
Meds if any: NOT Prescribed or Continued at Discharge

## 2018-03-28 NOTE — ULTRASOUND REPORT
EXAMINATION:
DUPLEX BILATERAL CAROTID ULTRASOUND
 
CLINICAL INFORMATION:
Syncope.
 
COMPARISON:
None.
 
TECHNIQUE:
Duplex bilateral carotid US was performed using real-time ultrasound and
Doppler techniques (integrating B-mode 2D vascular images, Doppler spectral
analysis and color flow Doppler imaging). These techniques were utilized to
interrogate the extracranial carotid and vertebral arteries bilaterally. The
degree of stenosis is based off criteria similar to NASCET.
 
FINDINGS:
1. On the right: Calcified plaque is present at the carotid bifurcation but
velocity measurements are normal and do not suggest a stenosis of greater
than 50% diameter reduction in the right ICA. The vertebral artery is patent
demonstrating antegrade flow.
 
2. On the left: Calcified plaque is present at the carotid bifurcation but
velocity measurements are normal and do not suggest a stenosis of greater
than 50% diameter reduction in the left ICA. The vertebral artery is patent
demonstrating antegrade flow.
 
The external carotid arteries appear unremarkable.
 
Incidental note of bilateral thyroid nodules which are incompletely
characterize.
 
IMPRESSION:
Plaque is present in the internal carotid arteries but velocity measurements
are normal and there is no evidence to suggest a hemodynamically significant
stenosis of greater than 50% diameter reduction.

## 2018-03-29 VITALS — DIASTOLIC BLOOD PRESSURE: 96 MMHG | SYSTOLIC BLOOD PRESSURE: 160 MMHG

## 2018-03-29 NOTE — PN- CARDIOLOGY
Subjective
Subjective:
The patient is feeling well.  He has been ambulatory without symptoms.  He has 
had a couple of periods of mild tachycardia in the 110-120 range which looks 
like probable sinus with a bundle branch block probably rate related.  There 
have been no periods of bradycardia.  The patient is eager to be discharged.  
His echocardiogram showed good left ventricular systolic function and no major 
abnormalities.
 
Objective
Vital Signs and I&Os
Vital Signs
 
 
Date Time Temp Pulse Resp B/P B/P Pulse O2 O2 Flow FiO2
 
     Mean Ox Delivery Rate 
 
03/29 0658 98.9 52 20 160/96  97 Room Air  
 
03/28 2240 98.7 60 21 138/74  96   
 
03/28 1440 98.6 54 20 144/86  96 Room Air  
 
 
 Intake & Output
 
 
 03/29 1600 03/29 0800 03/29 0000 03/28 1600 03/28 0800 03/28 0000
 
Intake Total  120 510  400 
 
Output Total      200
 
Balance  120 510  400 -200
 
       
 
Intake, IV   10   
 
Intake, Oral  120 500  400 
 
Output, Urine      200
 
Patient      125 lb
 
Weight      
 
 
 
Physical Exam:
He is in no distress
HEENT exam normal
Chest clear
Heart regular rhythm, no murmurs
Current Medications:
 Current Medications
 
 
  Sig/Pranav Start time  Last
 
Medication Dose Route Stop Time Status Admin
 
Acetaminophen 650 MG Q6P PRN 03/27 2115 AC 
 
  PO   
 
Aspirin Buffered 325 MG Q48 03/29 1000 AC 03/29
 
  PO   0622
 
Cholecalciferol 400 IU DAILY 03/28 1000 AC 03/28
 
  PO   0847
 
Heparin Sodium  5,000 UNIT Q8 03/27 2200 AC 
 
(Porcine)  SC   
 
Levothyroxine Sodium 0.075 MG DAILY AC 03/28 1200 AC 03/29
 
  PO   0551
 
Levothyroxine Sodium 0.075 MG DAILY AC 03/28 0700 DC 
 
  PO   
 
 
 
 
Results
Last 48 Hrs of Labs/Mics:
 Laboratory Tests
 
03/28/18 0415:
Troponin I 0.02
 
03/28/18 0415:
Anion Gap 11, Estimated GFR > 60, BUN/Creatinine Ratio 17.3, CBC w Diff MAN DIFF
ORDERED, RBC 4.51  L, MCV 96.5  H, MCH 31.6  H, MCHC 32.7  L, RDW 13.6, Plt 
Count ND, MPV 9.4, Gran % 78.3  H, Lymphocytes % 13.7  L, Monocytes % 6.4, 
Eosinophils % 1.3, Basophils % 0.3, Absolute Granulocytes 5.5, Segmented 
Neutrophils 83  H, Absolute Lymphocytes 1.0  L, Lymphocytes 9  L, Monocytes 5, 
Absolute Monocytes 0.4, Eosinophils 3, Absolute Eosinophils 0.1, Absolute 
Basophils 0, Platelet Estimate ADEQUATE, Polychromasia 1+, Hypochromic-
Microcytic 1+, Poikilocytosis 1+, Ovalocytes 1+, Fld Total RBCs Counted 100
 
03/27/18 2135:
Troponin I 0.02
 
03/27/18 1533:
Urine Color YEL, Urine Clarity CLEAR, Urine pH 6.0, Ur Specific Gravity 1.020, 
Urine Protein TRACE  H, Urine Ketones NEG, Urine Nitrite NEG, Urine Bilirubin 
NEG, Urine Urobilinogen 0.2, Ur Leukocyte Esterase NEG, Ur Microscopic SEDIMENT 
EXAMINED, Urine RBC FEW  H, Urine WBC 1-3  H, Ur Epithelial Cells RARE, Urine 
Bacteria FEW  H, Hyaline Casts RARE  H, Urine Mucus RARE, Urine Hemoglobin NEG, 
Urine Glucose NEG
 
03/27/18 1449:
Anion Gap 12, Estimated GFR 53  L, BUN/Creatinine Ratio 19.2, Glucose 176  H, 
Calcium 9.3, Magnesium 2.0, Total Bilirubin 0.6, AST 39, ALT 37, Alkaline 
Phosphatase 50, Troponin I < 0.01, Total Protein 6.9, Albumin 3.9, Globulin 3.0,
Albumin/Globulin Ratio 1.3, TSH &T3 &Free T4 Intrp 1.530, PT 11.2, INR 1.03, 
APTT 25, D-Dimer High Sensitivty 239, CBC w Diff NO MAN DIFF REQ, RBC 4.46  L, 
MCV 96.6  H, MCH 32.1  H, MCHC 33.2, RDW 13.9, MPV 9.1, Gran % 73.9, Lymphocytes
% 17.5  L, Monocytes % 5.6, Eosinophils % 2.1, Basophils % 0.9, Absolute 
Granulocytes 5.2, Absolute Lymphocytes 1.2, Absolute Monocytes 0.4, Absolute 
Eosinophils 0.1, Absolute Basophils 0.1
 
 
Assessment/Plan
Assessment/Plan
The patient is stable from cardiac standpoint.  There've been no arrhythmias 
that definitively explain his syncopal episodes.  He does have occasional 
periods of tachycardia which are probably sinus tachycardia with a rate related 
bundle branch block pattern.
 
I think the patient can be safely discharged.  He will report any additional 
symptoms.  I have asked him to contact my office to set up a Holter monitor and 
a follow-up office visit in the near future.
Continue telemetry? No

## 2018-03-29 NOTE — PN-OBSERVATION
Natalai Marie MD,[facility] 03/29/18 0730:
Observation Note
 
Observation Note
_
I have personally examined LEE SIERRA. him disposition is uncertain at this
time. Before a determination can be made, he requires continued observation for 
the following reasons [recurrent syncope pending workup].
 
 
Assessment/Plan Medical
Assessment:
77-year-old male with past medical history significant for hypothyroidism, 
whitecoat hypertension history of stage I diastolic dysfunction in 2013 and 
history of syncope, history of thyroid nodule brought to emergency department by
EMS after patient passed out on his toilet.
 
Vitals in emergency department, patient afebrile, no tachypnea, no tachycardia, 
no hypotension or uncontrolled hypertension, oxygen saturation of 97-99% on room
air.  EKG emergency department showed normal sinus rhythm as per EMS rhythm 
strip read as atrial fibrillation.  Head CT was negative for any intracranial 
findings.
 
Patient remained afebrile overnight.  Hemodynamically stable.  On telemetry 
monitor episodes of sinus bradycardia noticed.  Along with first-degree heart 
block, lowest heart rate 55 and highest heart rate 60.
 
Patient was placed in observation for the management of following problems
 
New episode of Syncope/history of syncope/history of diastolic dysfunction
Patient has been worked up in the past in 2013 for a similar episode of syncope.
 He was noticed to have stage I diastolic dysfunction, ejection fraction of 60% 
and mild mitral regurgitation and mild aortic regurg and aortic sclerosis.  
Current episode of syncope was situational as per the patient, initially he felt
lightheaded and then he crawled to the restroom floor and had a bowel movement 
and after that he does not remember when he lost consciousness. Patient denied 
any chest pain, shortness of breath, dyspnea on exertion, headaches, history of 
seizures, or sudden weakness.  Patient had a head CT that showed no acute 
intracranial abnormality in emergency department, CT cervical spine showed 
multilevel degenerative changes, atheromatous calcifications at the bilateral 
carotid bifurcations and 3.3 cm heterogenous lesion in the left lobe of thyroid.
 Patient was positive for orthostatic hypotension and on repeat today he is 
positive from laying to sitting position. This point out more towards syncope in
the setting of dehydration as per patient his water intake is only 1-2 glasses 
per day. He was counselled about adwquate water intake.Vasovagal syncopes and is
a possibility as patient had a bowel movement before syncope.  Echocardiogram 
showed ejection fraction of more than 65%, Normal left ventricular diastolic 
filling pattern for age.  No aortic stenosis. Ultrasound of carotids showed no 
evidence to suggest a hemodynamically significant stenosis of greater than 50% 
diameter reduction. Cardiology consult was also placed. On telemonitoring 
patient had some episodes of bradycardia and some conduction delays.  Therefore 
possibility of arrhythmia cannot be totally ruled out.  Patient will need long-
term monitoring for that. Patient was also counselled to eat appropriate amount 
of salt as he is limiting his salt intake for health concerns. He was also given
a compression stocking on discharge. 
 
History of thyroid nodule/hypothyroidism
 
Patient has history of thyroid nodule for which he follows up with Dr. Garcia.  CT
scan of the head also noticed heterogenous lesion on the left lower thyroid 
extending substernally into the superior mediastinum measuring 3.3 cm.  Patient 
will need further workup as an outpatient.  Patient is also on levothyroxine for
the management of hypothyroidism
 
CODE STATUS
Patient is DNR/DNI
 
Diet
Patient is on regular diet
Problem List:
 1. Syncope
   Qualifiers
 Syncope type: unspecified Qualified Code: R55 - Syncope and collapse
 
 2. Hypothyroidism
 
DVT/Prophylaxis: pharmacological
Consulting Request:
   Consulting Specialty: Cardiology
 
Discharge Plan
Discharge Disposition: home
Stable for Discharge? Yes
Anticipated Discharge (Day): today
 
Subjective
Follow-up For:
Recurrent Syncope
Complaints: no complaints
Tele-Events Since Last Visit:
On telemetry monitor episodes of sinus bradycardia noticed.  Along with first-
degree heart block, lowest heart rate 55 and highest heart rate 60.
Subjective:
Patient was noted he lying in the bed.  He remained afebrile overnight to 
complaints of shortness of breath or chest pain overnight.  He did not have any 
other syncopal episodes. 
 
Review of Systems
Constitutional:
Denies: chills, fever. 
Cardiovascular:
Denies: chest pain, palpitations. 
Respiratory:
Denies: cough, short of breath. 
Gastrointestinal:
Denies: abdominal pain, nausea, vomiting. 
 
Objective
Last 24 Hrs of Vital Signs/I&O
 Vital Signs
 
 
Date Time Temp Pulse Resp B/P B/P Pulse O2 O2 Flow FiO2
 
     Mean Ox Delivery Rate 
 
03/29 0658 98.9 52 20 160/96  97 Room Air  
 
03/28 2240 98.7 60 21 138/74  96   
 
 
 Intake & Output
 
 
 03/29 1600 03/29 0800 03/29 0000
 
Intake Total  120 510
 
Output Total   
 
Balance  120 510
 
    
 
Intake, IV   10
 
Intake, Oral  120 500
 
 
 
 
Physical Exam
General Appearance: Alert, Oriented X3, Cooperative
HEENT: Atraumatic, PERRLA
Neck: Supple, No JVD
Cardiovascular: Regular Rate, Normal S1, Normal S2
Lungs: Clear to Auscultation, Normal Air Movement
Abdomen: Soft, No Tenderness
Neurological: Normal Gait, Normal Speech, Strength at 5/5 X4 Ext, Normal Tone, 
Sensation Intact
Extremities: No Clubbing, No Edema
Vascular: Normal Pulses, Pulses Symmetrical
Current Medications:
 Current Medications
 
 
  Sig/Pranav Start time  Last
 
Medication Dose Route Stop Time Status Admin
 
Acetaminophen 650 MG Q6P PRN 03/27 2115 DCD 
 
  PO   
 
Aspirin Buffered 325 MG Q48 03/29 1000 DCD 03/29
 
  PO   0622
 
Cholecalciferol 400 IU DAILY 03/28 1000 DCD 03/28
 
  PO   0847
 
Heparin Sodium  5,000 UNIT Q8 03/27 2200 DCD 
 
(Porcine)  SC   
 
Levothyroxine Sodium 0.075 MG DAILY AC 03/28 1200 DCD 03/29
 
  PO   0551
 
 
 
 
Michael JON,Frank 03/29/18 1109:
Observation Note
 
Observation Note
_
I have personally examined LEE SIERRA. him disposition is uncertain at this
time. Before a determination can be made, he requires continued observation for 
the following reasons [].
 
Patient feels well and is asymptomatic.  He has been able to ambulate to the 
bathroom without any dizziness.  Denies any chest pain difficulty breathing 
fever chills or palpitations.  Chest exam is clear heart sounds S1 plus S2.  
Abdomen soft nontender pulses are present.  His vital signs are currently 
stable.
 
Troponin 3 sets have been negative.  Yesterday upon orthostatic checks patient 
blood pressure had dropped to 160s systolic to 142 systolic.  However today his 
blood pressure only dropped by 14.6.  Patient has not been using any salt in his
diet.
 
Assessment plan
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  This may explain his symptoms of
postural syncopal episode.  He should be counseled to sleep with head and 
elevated at least 30 at night.  
Patient may benefit from leg  stockings and should've rechecked his orthostatics
with stockings on.
 
Possible discharge home today after cardio evaluation
Patient will need outpatient Holter monitor
Follow-up with Dr. Gaviria as outpatient